# Patient Record
Sex: FEMALE | Race: WHITE | NOT HISPANIC OR LATINO | URBAN - METROPOLITAN AREA
[De-identification: names, ages, dates, MRNs, and addresses within clinical notes are randomized per-mention and may not be internally consistent; named-entity substitution may affect disease eponyms.]

---

## 2024-04-29 ENCOUNTER — LAB (OUTPATIENT)
Dept: LAB | Facility: LAB | Age: 39
End: 2024-04-29
Payer: COMMERCIAL

## 2024-04-29 ENCOUNTER — OFFICE VISIT (OUTPATIENT)
Dept: OBSTETRICS AND GYNECOLOGY | Facility: HOSPITAL | Age: 39
End: 2024-04-29
Payer: COMMERCIAL

## 2024-04-29 VITALS
BODY MASS INDEX: 22.66 KG/M2 | HEIGHT: 66 IN | SYSTOLIC BLOOD PRESSURE: 180 MMHG | DIASTOLIC BLOOD PRESSURE: 113 MMHG | WEIGHT: 141 LBS

## 2024-04-29 DIAGNOSIS — Z11.3 SCREEN FOR STD (SEXUALLY TRANSMITTED DISEASE): ICD-10-CM

## 2024-04-29 DIAGNOSIS — I10 HYPERTENSION, UNSPECIFIED TYPE: ICD-10-CM

## 2024-04-29 DIAGNOSIS — Z12.4 PAP SMEAR FOR CERVICAL CANCER SCREENING: ICD-10-CM

## 2024-04-29 DIAGNOSIS — N93.9 ABNORMAL UTERINE BLEEDING (AUB): Primary | ICD-10-CM

## 2024-04-29 DIAGNOSIS — N93.9 ABNORMAL UTERINE BLEEDING (AUB): ICD-10-CM

## 2024-04-29 LAB
ALBUMIN SERPL BCP-MCNC: 4.4 G/DL (ref 3.4–5)
ALP SERPL-CCNC: 78 U/L (ref 33–110)
ALT SERPL W P-5'-P-CCNC: 13 U/L (ref 7–45)
ANION GAP SERPL CALC-SCNC: 14 MMOL/L (ref 10–20)
AST SERPL W P-5'-P-CCNC: 21 U/L (ref 9–39)
BILIRUB SERPL-MCNC: 0.4 MG/DL (ref 0–1.2)
BUN SERPL-MCNC: 8 MG/DL (ref 6–23)
CALCIUM SERPL-MCNC: 10.1 MG/DL (ref 8.6–10.6)
CHLORIDE SERPL-SCNC: 104 MMOL/L (ref 98–107)
CO2 SERPL-SCNC: 26 MMOL/L (ref 21–32)
CREAT SERPL-MCNC: 0.88 MG/DL (ref 0.5–1.05)
EGFRCR SERPLBLD CKD-EPI 2021: 87 ML/MIN/1.73M*2
ERYTHROCYTE [DISTWIDTH] IN BLOOD BY AUTOMATED COUNT: 12.4 % (ref 11.5–14.5)
FSH SERPL-ACNC: 9 IU/L
GLUCOSE SERPL-MCNC: 78 MG/DL (ref 74–99)
HBV SURFACE AG SERPL QL IA: NONREACTIVE
HCT VFR BLD AUTO: 42.7 % (ref 36–46)
HCV AB SER QL: NONREACTIVE
HGB BLD-MCNC: 14 G/DL (ref 12–16)
HIV 1+2 AB+HIV1 P24 AG SERPL QL IA: NONREACTIVE
MCH RBC QN AUTO: 31.8 PG (ref 26–34)
MCHC RBC AUTO-ENTMCNC: 32.8 G/DL (ref 32–36)
MCV RBC AUTO: 97 FL (ref 80–100)
NRBC BLD-RTO: 0 /100 WBCS (ref 0–0)
PLATELET # BLD AUTO: 349 X10*3/UL (ref 150–450)
POTASSIUM SERPL-SCNC: 4.1 MMOL/L (ref 3.5–5.3)
PROT SERPL-MCNC: 7 G/DL (ref 6.4–8.2)
RBC # BLD AUTO: 4.4 X10*6/UL (ref 4–5.2)
SODIUM SERPL-SCNC: 140 MMOL/L (ref 136–145)
TREPONEMA PALLIDUM IGG+IGM AB [PRESENCE] IN SERUM OR PLASMA BY IMMUNOASSAY: NONREACTIVE
TSH SERPL-ACNC: 1.65 MIU/L (ref 0.44–3.98)
WBC # BLD AUTO: 10 X10*3/UL (ref 4.4–11.3)

## 2024-04-29 PROCEDURE — 99214 OFFICE O/P EST MOD 30 MIN: CPT | Performed by: OBSTETRICS & GYNECOLOGY

## 2024-04-29 PROCEDURE — 83001 ASSAY OF GONADOTROPIN (FSH): CPT

## 2024-04-29 PROCEDURE — 36415 COLL VENOUS BLD VENIPUNCTURE: CPT

## 2024-04-29 PROCEDURE — 86780 TREPONEMA PALLIDUM: CPT

## 2024-04-29 PROCEDURE — 99204 OFFICE O/P NEW MOD 45 MIN: CPT | Performed by: OBSTETRICS & GYNECOLOGY

## 2024-04-29 PROCEDURE — 1036F TOBACCO NON-USER: CPT | Performed by: OBSTETRICS & GYNECOLOGY

## 2024-04-29 PROCEDURE — 80053 COMPREHEN METABOLIC PANEL: CPT

## 2024-04-29 PROCEDURE — 87624 HPV HI-RISK TYP POOLED RSLT: CPT | Mod: 59 | Performed by: OBSTETRICS & GYNECOLOGY

## 2024-04-29 PROCEDURE — 87661 TRICHOMONAS VAGINALIS AMPLIF: CPT | Mod: 59 | Performed by: OBSTETRICS & GYNECOLOGY

## 2024-04-29 PROCEDURE — 87389 HIV-1 AG W/HIV-1&-2 AB AG IA: CPT

## 2024-04-29 PROCEDURE — 86803 HEPATITIS C AB TEST: CPT

## 2024-04-29 PROCEDURE — 3080F DIAST BP >= 90 MM HG: CPT | Performed by: OBSTETRICS & GYNECOLOGY

## 2024-04-29 PROCEDURE — 85027 COMPLETE CBC AUTOMATED: CPT

## 2024-04-29 PROCEDURE — 88175 CYTOPATH C/V AUTO FLUID REDO: CPT | Mod: TC,GCY | Performed by: OBSTETRICS & GYNECOLOGY

## 2024-04-29 PROCEDURE — 3077F SYST BP >= 140 MM HG: CPT | Performed by: OBSTETRICS & GYNECOLOGY

## 2024-04-29 PROCEDURE — 87340 HEPATITIS B SURFACE AG IA: CPT

## 2024-04-29 PROCEDURE — 87800 DETECT AGNT MULT DNA DIREC: CPT | Performed by: OBSTETRICS & GYNECOLOGY

## 2024-04-29 PROCEDURE — 84443 ASSAY THYROID STIM HORMONE: CPT

## 2024-04-29 RX ORDER — GABAPENTIN 300 MG/1
CAPSULE ORAL
COMMUNITY
Start: 2024-04-09

## 2024-04-29 RX ORDER — BUPROPION HYDROCHLORIDE 150 MG/1
150 TABLET ORAL
COMMUNITY

## 2024-04-29 RX ORDER — CITALOPRAM 10 MG/1
10 TABLET ORAL
COMMUNITY
Start: 2024-04-09

## 2024-04-29 RX ORDER — BUSPIRONE HYDROCHLORIDE 15 MG/1
15 TABLET ORAL 3 TIMES DAILY
COMMUNITY
Start: 2024-04-09

## 2024-04-29 RX ORDER — METHYLPHENIDATE HYDROCHLORIDE 18 MG/1
1 TABLET, EXTENDED RELEASE ORAL DAILY
COMMUNITY
Start: 2024-04-20

## 2024-04-29 NOTE — PROGRESS NOTES
Subjective   Patient ID: Jessica Franco is a 38 y.o. female who presents for Menstrual Problem (Patient states that she had a menstrual on 04/02/2024 lasted 5 days/Patient states that she got another menstrual 04/14/2024 lasted 3 days/Both menstruals were heavy, clotting, painful which is different then her normal menstrual cramping that she experiences).  38 year old patient, presents with complaint of heavy menses, hx ovarian cysts. Last seen by gyn 10 years ago.   Fam Hx Cervical cancer.     Placed on birth control for bleeding issues at age 19.  Menses was early in January (normally regular). Normal menses in February. In April menses a day late. Heavier bleeding than normal in April, passed small clots (pea-sized). Started 4/2, on 4/5 had a gush of blood. Bleeding lasted until 6th. Started spotting about a week later, started having reddish brown bleeding on 14th, on 18th had another normal cycle.   Took UPT x2, both negative. Bleeding stopped on the 20th, but has had ongoing cramping, which stopped by next day.     Health has otherwise been good.   +fatigue and some light-headedness.     Sexually active, one partner.    Review of Systems   All other systems reviewed and are negative.    Objective   Physical Exam  Vitals reviewed.   Constitutional:       Appearance: Normal appearance.   HENT:      Head: Normocephalic and atraumatic.      Right Ear: External ear normal.      Left Ear: External ear normal.      Nose: Nose normal.      Mouth/Throat:      Mouth: Mucous membranes are moist.   Eyes:      Extraocular Movements: Extraocular movements intact.   Neck:      Thyroid: No thyroid mass or thyromegaly.   Abdominal:      General: Abdomen is flat. Bowel sounds are normal.      Palpations: Abdomen is soft.      Tenderness: There is no abdominal tenderness. There is no right CVA tenderness or left CVA tenderness.   Genitourinary:     General: Normal vulva.      Exam position: Lithotomy position.      Labia:          Right: No lesion.         Left: No lesion.       Urethra: No prolapse, urethral swelling or urethral lesion.      Vagina: Normal.      Cervix: Normal.      Uterus: Normal.       Adnexa: Right adnexa normal and left adnexa normal.   Musculoskeletal:         General: Normal range of motion.      Right shoulder: Normal.      Left shoulder: Normal.      Cervical back: Normal, normal range of motion and neck supple.      Thoracic back: Normal.      Lumbar back: Normal.      Right hip: Normal.      Left hip: Normal.      Right upper leg: Normal.      Left upper leg: Normal.      Right knee: Normal.      Left knee: Normal.      Right lower leg: Normal.      Left lower leg: Normal.   Lymphadenopathy:      Lower Body: No right inguinal adenopathy. No left inguinal adenopathy.   Skin:     General: Skin is warm and dry.   Neurological:      General: No focal deficit present.      Mental Status: She is alert and oriented to person, place, and time.      Cranial Nerves: Cranial nerves 2-12 are intact.      Motor: Motor function is intact.   Psychiatric:         Attention and Perception: Attention and perception normal.         Mood and Affect: Mood and affect normal.         Behavior: Behavior normal.         Cognition and Memory: Cognition normal.         Judgment: Judgment normal.     Assessment/Plan   Problem List Items Addressed This Visit    None  Visit Diagnoses         Codes    Abnormal uterine bleeding (AUB)    -  Primary N93.9    Relevant Orders    CBC    US PELVIS TRANSABDOMINAL WITH TRANSVAGINAL    Follicle Stimulating Hormone    TSH with reflex to Free T4 if abnormal    Hypertension, unspecified type     I10    Relevant Orders    Referral to Primary Care    Comprehensive Metabolic Panel    Pap smear for cervical cancer screening     Z12.4    Relevant Orders    THINPREP PAP    Screen for STD (sexually transmitted disease)     Z11.3    Relevant Orders    Hepatitis B surface Ag    Hepatitis C Antibody    Syphilis Screen  with Reflex    HIV 1/2 Antigen/Antibody Screen with Reflex to Confirmation        Kim Navas MD 04/29/24 10:46 AM

## 2024-04-30 LAB
C TRACH RRNA SPEC QL NAA+PROBE: NEGATIVE
N GONORRHOEA DNA SPEC QL PROBE+SIG AMP: NEGATIVE
T VAGINALIS RRNA SPEC QL NAA+PROBE: NEGATIVE

## 2024-05-10 ENCOUNTER — TELEPHONE (OUTPATIENT)
Dept: OBSTETRICS AND GYNECOLOGY | Facility: HOSPITAL | Age: 39
End: 2024-05-10
Payer: COMMERCIAL

## 2024-05-10 LAB
CYTOLOGY CMNT CVX/VAG CYTO-IMP: NORMAL
HPV HR 12 DNA GENITAL QL NAA+PROBE: POSITIVE
HPV HR GENOTYPES PNL CVX NAA+PROBE: POSITIVE
HPV16 DNA SPEC QL NAA+PROBE: NEGATIVE
HPV18 DNA SPEC QL NAA+PROBE: NEGATIVE
LAB AP HPV GENOTYPE QUESTION: YES
LAB AP HPV HR: NORMAL
LAB AP PAP ADDITIONAL TESTS: NORMAL
LABORATORY COMMENT REPORT: NORMAL
LMP START DATE: NORMAL
MENSTRUAL HX REPORTED: NORMAL
PATH REPORT.TOTAL CANCER: NORMAL

## 2024-05-13 NOTE — TELEPHONE ENCOUNTER
----- Message from Kim Navas MD sent at 5/10/2024 11:15 AM EDT -----  Recommend colposcopy    RN placed call to patient to discuss pap results.   Patient identified by name and .  Patient informed her pap results came back with abnormal cells referred to as ASCUS  Patient notified that pap also came back with +HPV  Patient educated that HPV is a virus that is transmitted sexually and can cause cancer and/or genital warts over time.  Patient educated that she will need a colposcopy  Explained to patient that a colposcopy is an in office procedure that starts out like a normal pap but the provider will use a colposcope to get a better view of the abnormal cells on the cervix. The provider will then take a small biopsy to send to the lab to make sure nothing comes back precancerous  Patient encouraged to take tylenol or ibuprofen about one hour prior to appointment to help with any cramping  Explained to patient that she may experience some spotting for a few days  Patient scheduled for colpo on  at 9am with Dr. Navas  Encouraged patient to call office with any questions or concerns   Reyna Garza RN

## 2024-05-15 ENCOUNTER — APPOINTMENT (OUTPATIENT)
Dept: RADIOLOGY | Facility: HOSPITAL | Age: 39
End: 2024-05-15
Payer: COMMERCIAL

## 2024-05-21 ENCOUNTER — HOSPITAL ENCOUNTER (OUTPATIENT)
Dept: RADIOLOGY | Facility: HOSPITAL | Age: 39
Discharge: HOME | End: 2024-05-21
Payer: COMMERCIAL

## 2024-05-21 DIAGNOSIS — N93.9 ABNORMAL UTERINE BLEEDING (AUB): ICD-10-CM

## 2024-05-21 PROCEDURE — 76856 US EXAM PELVIC COMPLETE: CPT | Performed by: STUDENT IN AN ORGANIZED HEALTH CARE EDUCATION/TRAINING PROGRAM

## 2024-05-21 PROCEDURE — 76830 TRANSVAGINAL US NON-OB: CPT | Performed by: STUDENT IN AN ORGANIZED HEALTH CARE EDUCATION/TRAINING PROGRAM

## 2024-05-21 PROCEDURE — 76856 US EXAM PELVIC COMPLETE: CPT

## 2024-05-22 ENCOUNTER — TELEPHONE (OUTPATIENT)
Dept: OBSTETRICS AND GYNECOLOGY | Facility: HOSPITAL | Age: 39
End: 2024-05-22
Payer: COMMERCIAL

## 2024-05-22 NOTE — TELEPHONE ENCOUNTER
----- Message from Kim Navas MD sent at 5/22/2024  8:16 AM EDT -----  Labs, Ultrasound look normal. No clear reason for the abnormal bleeding at this point. Not concerned about the cervix. Can we find out what her menses is doing?

## 2024-05-23 NOTE — TELEPHONE ENCOUNTER
Result Communication    Resulted Orders   US PELVIS TRANSABDOMINAL WITH TRANSVAGINAL    Narrative    Interpreted By:  Froylan Mcnamara,  and Juana Duarte   STUDY:  US PELVIS TRANSABDOMINAL WITH TRANSVAGINAL;  5/21/2024 1:27 pm      INDICATION:  Signs/Symptoms:Abnormal bleeding, hx ovarian cysts.      COMPARISON:  None.      ACCESSION NUMBER(S):  [id number]      ORDERING CLINICIAN:  SRAVAN SHEARER      TECHNIQUE:  Multiple multiplanar static gray scale, color and spectral waveform  sonographic images of the pelvis were obtained. Transabdominal and  endovaginal ultrasound was performed.      FINDINGS:  UTERUS:  The uterus measures  4.35 cm x 3.57 cm x 7.35cm. The uterine  myometrium appears normal. There is a heterogeneous appearance of the  cervix without sonographic evidence of focal lesions.      ENDOMETRIUM:  The endometrium measures a thickness of 0.36 cm, which is normal.      RIGHT ADNEXA:  The right ovary measures 1.60 cm x 1.69 cm x 2.83 cm and demonstrates  normal flow. No gross right adnexal masses are seen, no hydrosalpinx.  There are cysts in the right ovary measuring 1.3 x 1.3 x 1.2 cm and 1  x 0.7 x 0.8 cm.      LEFT ADNEXA:  The left ovary measures 2.46 cm x 2.48 cm x 3.70 cm and demonstrates  normal flow. No gross left adnexal masses are seen, no hydrosalpinx.  There is a cyst in the left ovary measuring 1 x 1.2 x 1.1 cm.      CUL DE SAC:  No gross free fluid is seen in the pelvic cul-de-sac.        Impression    1. Indeterminate heterogeneous appearance of the endocervix without  sonographic evidence of focal lesions. Hysteroscopy can be obtained  for further assessment as per clinical discretion.  2. Otherwise, unremarkable pelvic ultrasound.      I personally reviewed the images/study and I agree with the findings  as stated by resident physician Dr. Gerald Hsu . This study  was interpreted at [facility],  [place], [place].      MACRO:  None       Signed by: Froylan Mcnamara 2024 9:12 PM  Dictation workstation:   [id number]       Verified patient by name and .   Called patient to let her know hat results came back normal, no clear reason for abnormal bleeding.   Patient states the abnormal bleeding has subsided and she had a normal period last week.   Discussed with patient that sometimes women can have abnormal bleeding without any cause and go back to having normal periods.   Also reminded patient about upcoming colpo appointment   Results were successfully communicated with the patient and they acknowledged their understanding.

## 2024-06-10 ENCOUNTER — TELEPHONE (OUTPATIENT)
Dept: OBSTETRICS AND GYNECOLOGY | Facility: HOSPITAL | Age: 39
End: 2024-06-10
Payer: COMMERCIAL

## 2024-06-10 NOTE — TELEPHONE ENCOUNTER
Verified patient by name and .   Patient calling in with concerns regarding period and colposcopy tomorrow.   Patient reports her period started early and has been light to moderate in flow.   Let patient know she is still able to have colposcopy done as long as her flow is not heavy.   Patient verbalized understanding and all questions and concerns addressed.

## 2024-06-11 ENCOUNTER — PROCEDURE VISIT (OUTPATIENT)
Dept: OBSTETRICS AND GYNECOLOGY | Facility: HOSPITAL | Age: 39
End: 2024-06-11
Payer: COMMERCIAL

## 2024-06-11 VITALS — SYSTOLIC BLOOD PRESSURE: 158 MMHG | DIASTOLIC BLOOD PRESSURE: 127 MMHG | BODY MASS INDEX: 23.08 KG/M2 | WEIGHT: 143 LBS

## 2024-06-11 DIAGNOSIS — R87.810 ASCUS WITH POSITIVE HIGH RISK HPV CERVICAL: Primary | ICD-10-CM

## 2024-06-11 DIAGNOSIS — R87.610 ASCUS WITH POSITIVE HIGH RISK HPV CERVICAL: Primary | ICD-10-CM

## 2024-06-11 PROCEDURE — 57455 BIOPSY OF CERVIX W/SCOPE: CPT | Performed by: OBSTETRICS & GYNECOLOGY

## 2024-06-11 ASSESSMENT — PAIN SCALES - GENERAL: PAINLEVEL: 0-NO PAIN

## 2024-06-11 NOTE — PROGRESS NOTES
Patient ID: Jessica Franco is a 38 y.o. female who presents for colposcopy.  Pap 4/29/24 ASCUS +HR HPV other    Colposcopy    Date/Time: 6/11/2024 9:38 AM    Performed by: Kim Navas MD  Authorized by: Kim Navas MD    Procedure location: cervix    Consent:     Patient questions answered: yes      Risks and benefits of the procedure and its alternatives discussed: yes      Procedural risks discussed:  Bleeding and infection    Consent obtained:  Written    Consent given by:  Patient  Universal Protocol:     A time out verifies correct patient, procedure, equipment, support staff, and site/side marked as required: yes      Patient states understanding of procedure being performed: yes      Test results available and properly labeled: yes    Indication:     Cervical indication(s): high-risk HPV positive and ASCUS    Pre-procedure:     Speculum was placed in the vagina: yes      Prep solution(s): acetic acid    Procedure:     Colposcopy with: colposcopy only and cervical biopsy      Biopsy taken: yes      # of biopsies:  1    Biopsy location(s): 12    Cervix visibility: fully visualized      SCJ visibility: fully visualized      Lesion visualized: fully visualized      Acetowhite lesion(s): cervix      Acetowhite lesion(s) description:  Thin area surrounding T-zone    Cervical impression: low grade    Post-procedure:     Patient tolerance of procedure:  Patient tolerated the procedure well with no immediate complications    Instructions and paperwork completed: yes        AW changes with mosaic pattern from 11-2   A: Mild dysplasia/RAD 1  Biopsy sent. Will call with results.

## 2024-06-18 LAB
LABORATORY COMMENT REPORT: NORMAL
PATH REPORT.FINAL DX SPEC: NORMAL
PATH REPORT.GROSS SPEC: NORMAL
PATH REPORT.RELEVANT HX SPEC: NORMAL
PATH REPORT.TOTAL CANCER: NORMAL

## 2024-06-19 ENCOUNTER — OFFICE VISIT (OUTPATIENT)
Dept: PRIMARY CARE | Facility: CLINIC | Age: 39
End: 2024-06-19
Payer: COMMERCIAL

## 2024-06-19 ENCOUNTER — TELEPHONE (OUTPATIENT)
Dept: OBSTETRICS AND GYNECOLOGY | Facility: HOSPITAL | Age: 39
End: 2024-06-19

## 2024-06-19 VITALS
WEIGHT: 141 LBS | DIASTOLIC BLOOD PRESSURE: 107 MMHG | SYSTOLIC BLOOD PRESSURE: 149 MMHG | OXYGEN SATURATION: 96 % | HEIGHT: 66 IN | BODY MASS INDEX: 22.66 KG/M2 | HEART RATE: 91 BPM

## 2024-06-19 DIAGNOSIS — F98.8 ATTENTION DEFICIT DISORDER, UNSPECIFIED HYPERACTIVITY PRESENCE: ICD-10-CM

## 2024-06-19 DIAGNOSIS — K27.9 PEPTIC ULCER DISEASE: ICD-10-CM

## 2024-06-19 DIAGNOSIS — Z00.00 HEALTH CARE MAINTENANCE: ICD-10-CM

## 2024-06-19 DIAGNOSIS — K29.70 GASTRITIS, PRESENCE OF BLEEDING UNSPECIFIED, UNSPECIFIED CHRONICITY, UNSPECIFIED GASTRITIS TYPE: ICD-10-CM

## 2024-06-19 DIAGNOSIS — I10 HYPERTENSION, UNSPECIFIED TYPE: Primary | ICD-10-CM

## 2024-06-19 DIAGNOSIS — R19.7 DIARRHEA, UNSPECIFIED TYPE: ICD-10-CM

## 2024-06-19 PROBLEM — F41.9 ANXIETY: Status: ACTIVE | Noted: 2020-12-28

## 2024-06-19 PROBLEM — G93.32 MYALGIC ENCEPHALOMYELITIS: Status: ACTIVE | Noted: 2020-11-04

## 2024-06-19 PROBLEM — F43.20 GRIEF REACTION: Status: ACTIVE | Noted: 2020-12-28

## 2024-06-19 PROBLEM — R10.13 EPIGASTRIC PAIN: Status: ACTIVE | Noted: 2018-11-07

## 2024-06-19 PROBLEM — F41.1 GENERALIZED ANXIETY DISORDER: Status: ACTIVE | Noted: 2021-07-12

## 2024-06-19 PROBLEM — R10.30 LOWER ABDOMINAL PAIN: Status: ACTIVE | Noted: 2019-07-24

## 2024-06-19 PROBLEM — K29.90 GASTRODUODENITIS: Status: ACTIVE | Noted: 2018-11-07

## 2024-06-19 PROBLEM — F43.21 GRIEF REACTION: Status: ACTIVE | Noted: 2020-12-28

## 2024-06-19 PROCEDURE — 3080F DIAST BP >= 90 MM HG: CPT | Performed by: INTERNAL MEDICINE

## 2024-06-19 PROCEDURE — 1036F TOBACCO NON-USER: CPT | Performed by: INTERNAL MEDICINE

## 2024-06-19 PROCEDURE — 3077F SYST BP >= 140 MM HG: CPT | Performed by: INTERNAL MEDICINE

## 2024-06-19 PROCEDURE — 99204 OFFICE O/P NEW MOD 45 MIN: CPT | Performed by: INTERNAL MEDICINE

## 2024-06-19 RX ORDER — HYDROCHLOROTHIAZIDE 25 MG/1
25 TABLET ORAL DAILY
Qty: 30 TABLET | Refills: 1 | Status: SHIPPED | OUTPATIENT
Start: 2024-06-19 | End: 2024-08-18

## 2024-06-19 RX ORDER — CHOLESTYRAMINE 4 G/5.5G
4 POWDER, FOR SUSPENSION ORAL 3 TIMES DAILY
Qty: 90 PACKET | Refills: 1 | Status: SHIPPED | OUTPATIENT
Start: 2024-06-19

## 2024-06-19 NOTE — LETTER
Dear Jessica,       We have been unable to reach you by phone and would like to discuss results.     Please call [phone] to discuss at your earliest convenience.

## 2024-06-19 NOTE — ASSESSMENT & PLAN NOTE
Hypertension new diagnosis.  Start hydrochlorothiazide.  Question as to whether or not it might be a side effect of the Ritalin or the Wellbutrin.  Please discuss with your psychiatrist.  In the meantime start the hydrochlorothiazide.  Advised low-salt diet.  Follow-up 1 month for blood pressure recheck.

## 2024-06-19 NOTE — ASSESSMENT & PLAN NOTE
Misc Possibly related to cholecystectomy.  Start cholestyramine.  Follow-up 30 days for reevaluation

## 2024-06-19 NOTE — PROGRESS NOTES
"Subjective   Patient ID: Jessica Franco is a 38 y.o. female who presents for Establish Care.    HPI     Patient is a 38-year-old female with past medical history of anxiety depression ADD who presents at the request of her gynecologist due to significantly elevated blood pressure readings.  She was in the emergency room in April and noted to have significantly elevated blood pressure and subsequently triggered during a gynecology office visit her blood pressure was also substantially elevated.  She has no headache changes in vision orthopnea.  She has been on Wellbutrin since 2019.  She has been on the Ritalin for a number of years as well.  No recent changes in dietary habits.  No recent triggers or stressors.    She works as a nurse.  Denies illicit substances or smoking.  Drinks alcohol occasionally.    She does have episodes of diarrhea which has been thought to be due to be post cholecystectomy diarrhea.  She has been on cholestyramine in the past but currently not on the medication.  She is up-to-date on her screening labs other than her lipid panel and she is up to date on her Pap smear        Review of Systems  Constitutional: No fever or chills  Cardiovascular: no chest pain, no palpitations and no syncope.   Respiratory: no cough, no shortness of breath during exertion and no shortness of breath at rest.   Gastrointestinal: no abdominal pain, no nausea and no vomiting.  Neuro: No Headache, no dizziness    Objective   BP (!) 149/107   Pulse 91   Ht 1.676 m (5' 6\")   Wt 64 kg (141 lb)   LMP 06/09/2024 (Exact Date)   SpO2 96%   BMI 22.76 kg/m²     Physical Exam  Constitutional: Alert and in no acute distress. Well developed, well nourished  Head and Face: Head and face: Normal.    Cardiovascular: Heart rate and rhythm were normal, normal S1 and S2. No peripheral edema.   Pulmonary: No respiratory distress. Clear bilateral breath sounds.  Musculoskeletal: Gait and station: Normal. Muscle strength/tone: " Normal.   Skin: Normal skin color and pigmentation, normal skin turgor, and no rash.    Psychiatric: Judgment and insight: Intact. Mood and affect: Normal.  Abdomen soft right upper quadrant tenderness to palpation but no guarding.     Procedures    Lab Results   Component Value Date    WBC 10.0 04/29/2024    HGB 14.0 04/29/2024    HCT 42.7 04/29/2024     04/29/2024    ALT 13 04/29/2024    AST 21 04/29/2024     04/29/2024    K 4.1 04/29/2024     04/29/2024    CREATININE 0.88 04/29/2024    BUN 8 04/29/2024    CO2 26 04/29/2024    TSH 1.65 04/29/2024       US PELVIS TRANSABDOMINAL WITH TRANSVAGINAL  Narrative: Interpreted By:  Froylan Mcnamara,  and Juana Duarte   STUDY:  US PELVIS TRANSABDOMINAL WITH TRANSVAGINAL;  5/21/2024 1:27 pm      INDICATION:  Signs/Symptoms:Abnormal bleeding, hx ovarian cysts.      COMPARISON:  None.      ACCESSION NUMBER(S):  [id number]      ORDERING CLINICIAN:  SRAVAN SHEARER      TECHNIQUE:  Multiple multiplanar static gray scale, color and spectral waveform  sonographic images of the pelvis were obtained. Transabdominal and  endovaginal ultrasound was performed.      FINDINGS:  UTERUS:  The uterus measures  4.35 cm x 3.57 cm x 7.35cm. The uterine  myometrium appears normal. There is a heterogeneous appearance of the  cervix without sonographic evidence of focal lesions.      ENDOMETRIUM:  The endometrium measures a thickness of 0.36 cm, which is normal.      RIGHT ADNEXA:  The right ovary measures 1.60 cm x 1.69 cm x 2.83 cm and demonstrates  normal flow. No gross right adnexal masses are seen, no hydrosalpinx.  There are cysts in the right ovary measuring 1.3 x 1.3 x 1.2 cm and 1  x 0.7 x 0.8 cm.      LEFT ADNEXA:  The left ovary measures 2.46 cm x 2.48 cm x 3.70 cm and demonstrates  normal flow. No gross left adnexal masses are seen, no hydrosalpinx.  There is a cyst in the left ovary measuring 1 x 1.2 x 1.1 cm.      CUL DE SAC:  No gross free fluid is seen  in the pelvic cul-de-sac.      Impression: 1. Indeterminate heterogeneous appearance of the endocervix without  sonographic evidence of focal lesions. Hysteroscopy can be obtained  for further assessment as per clinical discretion.  2. Otherwise, unremarkable pelvic ultrasound.      I personally reviewed the images/study and I agree with the findings  as stated by resident physician Dr. Gerald Hsu . This study  was interpreted at [facility],  [place], [place].      MACRO:  None      Signed by: Froylan Mcnamara 5/21/2024 9:12 PM  Dictation workstation:   [id number]            Assessment/Plan   Problem List Items Addressed This Visit             ICD-10-CM    Attention deficit disorder F98.8     Continue Ritalin and Wellbutrin per psychiatry         Diarrhea R19.7     Possibly related to cholecystectomy.  Start cholestyramine.  Follow-up 30 days for reevaluation         Relevant Medications    cholestyramine-aspartame (Prevalite) 4 gram packet    Hypertension - Primary I10     Hypertension new diagnosis.  Start hydrochlorothiazide.  Question as to whether or not it might be a side effect of the Ritalin or the Wellbutrin.  Please discuss with your psychiatrist.  In the meantime start the hydrochlorothiazide.  Advised low-salt diet.  Follow-up 1 month for blood pressure recheck.         Relevant Medications    hydroCHLOROthiazide (HYDRODiuril) 25 mg tablet    Gastritis K29.70    Peptic ulcer disease K27.9     Asymptomatic.  Will continue to monitor at this time.          Other Visit Diagnoses         Codes    Health care maintenance     Z00.00    Relevant Orders    Lipid Panel

## 2024-06-19 NOTE — TELEPHONE ENCOUNTER
----- Message from Kim Navas MD sent at 6/19/2024 11:53 AM EDT -----  Biopsy negative. Follow-up Pap with HPV in 1 year.

## 2024-06-28 NOTE — TELEPHONE ENCOUNTER
Several attempts have been made to contact the patient   RN called patient at the number listed in her chart on 6/19,6/21 and 6/28.  A [employer] message was also sent on 6/25 and has not alyce read.   A certified letter is being sent to the address on file encouraging patient to call the office, provider notified.  Tracking number: [id number].

## 2024-07-08 NOTE — TELEPHONE ENCOUNTER
Result Communication    Resulted Orders   Surgical Pathology Exam   Result Value Ref Range    Case Report       Surgical Pathology                                Case: [id number]                                  Authorizing Provider:  Kim Navas MD   Collected:           06/11/2024 1022              Ordering Location:     [facility]       Received:            06/11/2024 [facility]                                     [facility]                                                                     Pathologist:           Oleg Snow MD                                                        Specimen:    CERVIX BIOPSY, 12                                                                          FINAL DIAGNOSIS       A. CERVIX BIOPSY:   -- TRANSFORMATION ZONE, NO PATHOLOGIC DIAGNOSIS              By the signature on this report, the individual or group listed as making the Final Interpretation/Diagnosis certifies that they have reviewed this case.       Clinical History       ASCUS +HR HPV      Gross Description       A: Received in formalin, labeled with the patient's name and hospital number, is a crescent shaped fragment of mucosa covered soft tissue admixed with mucoid material measuring 0.4 x 0.3 x 0.2 cm.  The deep surface is inked blue.  The specimen is submitted in toto in one cassette.    MRS           11:18 AM    Patient called in regarding certified letter.   Let patient know her biopsy was negative and Dr. Navas is recommending follow up with repeat pap in 1 year.   Reminder added to system.   Results were successfully communicated with the patient and they acknowledged their understanding.

## 2024-07-12 ENCOUNTER — APPOINTMENT (OUTPATIENT)
Dept: PRIMARY CARE | Facility: CLINIC | Age: 39
End: 2024-07-12
Payer: COMMERCIAL

## 2024-07-24 ENCOUNTER — APPOINTMENT (OUTPATIENT)
Dept: PRIMARY CARE | Facility: CLINIC | Age: 39
End: 2024-07-24
Payer: COMMERCIAL

## 2024-07-24 VITALS
DIASTOLIC BLOOD PRESSURE: 88 MMHG | OXYGEN SATURATION: 98 % | HEART RATE: 71 BPM | WEIGHT: 142 LBS | BODY MASS INDEX: 22.92 KG/M2 | SYSTOLIC BLOOD PRESSURE: 138 MMHG

## 2024-07-24 DIAGNOSIS — I10 HYPERTENSION, UNSPECIFIED TYPE: ICD-10-CM

## 2024-07-24 PROCEDURE — 3079F DIAST BP 80-89 MM HG: CPT | Performed by: INTERNAL MEDICINE

## 2024-07-24 PROCEDURE — 1036F TOBACCO NON-USER: CPT | Performed by: INTERNAL MEDICINE

## 2024-07-24 PROCEDURE — 3075F SYST BP GE 130 - 139MM HG: CPT | Performed by: INTERNAL MEDICINE

## 2024-07-24 PROCEDURE — 99213 OFFICE O/P EST LOW 20 MIN: CPT | Performed by: INTERNAL MEDICINE

## 2024-07-24 RX ORDER — HYDROCHLOROTHIAZIDE 50 MG/1
50 TABLET ORAL DAILY
Qty: 30 TABLET | Refills: 1 | Status: SHIPPED | OUTPATIENT
Start: 2024-07-24 | End: 2024-09-22

## 2024-07-24 RX ORDER — HYDROCHLOROTHIAZIDE 25 MG/1
50 TABLET ORAL DAILY
Qty: 30 TABLET | Refills: 1 | Status: SHIPPED | OUTPATIENT
Start: 2024-07-24 | End: 2024-07-24

## 2024-07-24 NOTE — PROGRESS NOTES
Subjective   Patient ID: Jessica Franco is a 38 y.o. female who presents for Follow-up.    HPI     Patient is a 38-year-old female with past medical history of anxiety depression ADD who presents for follow-up to hypertension.    She does have ongoing anxiety and depression as well as ADHD currently on Wellbutrin and Ritalin.  Last visit she was started on HCTZ.  Blood pressure today improved but not at goal.  She takes her blood pressure at home and its consistently in the low 140s systolic.  She takes her medication as prescribed.  Other than increased urination she is tolerating well.  No headaches changes in vision orthopnea.      She works as a nurse.  Denies illicit substances or smoking.  Drinks alcohol occasionally.    She does have episodes of diarrhea which has been thought to be due to be post cholecystectomy diarrhea.  She has been on cholestyramine in the past and recently restarted.  She states that her symptoms have improved.        Review of Systems  Constitutional: No fever or chills  Cardiovascular: no chest pain, no palpitations and no syncope.   Respiratory: no cough, no shortness of breath during exertion and no shortness of breath at rest.   Gastrointestinal: no abdominal pain, no nausea and no vomiting.  Neuro: No Headache, no dizziness    Objective   BP (!) 153/112   Pulse 71   Wt 64.4 kg (142 lb)   SpO2 98%   BMI 22.92 kg/m²     Physical Exam  Constitutional: Alert and in no acute distress. Well developed, well nourished  Head and Face: Head and face: Normal.    Cardiovascular: Heart rate and rhythm were normal, normal S1 and S2. No peripheral edema.   Pulmonary: No respiratory distress. Clear bilateral breath sounds.  Musculoskeletal: Gait and station: Normal. Muscle strength/tone: Normal.   Skin: Normal skin color and pigmentation, normal skin turgor, and no rash.    Psychiatric: Judgment and insight: Intact. Mood and affect: Normal.  Abdomen soft right upper quadrant tenderness to  palpation but no guarding.     Procedures    Lab Results   Component Value Date    WBC 10.0 04/29/2024    HGB 14.0 04/29/2024    HCT 42.7 04/29/2024     04/29/2024    ALT 13 04/29/2024    AST 21 04/29/2024     04/29/2024    K 4.1 04/29/2024     04/29/2024    CREATININE 0.88 04/29/2024    BUN 8 04/29/2024    CO2 26 04/29/2024    TSH 1.65 04/29/2024       US PELVIS TRANSABDOMINAL WITH TRANSVAGINAL  Narrative: Interpreted By:  Froylan Mcnamara,  and Juana Duarte   STUDY:  US PELVIS TRANSABDOMINAL WITH TRANSVAGINAL;  5/21/2024 1:27 pm      INDICATION:  Signs/Symptoms:Abnormal bleeding, hx ovarian cysts.      COMPARISON:  None.      ACCESSION NUMBER(S):  [id number]      ORDERING CLINICIAN:  SRAVAN SHEARER      TECHNIQUE:  Multiple multiplanar static gray scale, color and spectral waveform  sonographic images of the pelvis were obtained. Transabdominal and  endovaginal ultrasound was performed.      FINDINGS:  UTERUS:  The uterus measures  4.35 cm x 3.57 cm x 7.35cm. The uterine  myometrium appears normal. There is a heterogeneous appearance of the  cervix without sonographic evidence of focal lesions.      ENDOMETRIUM:  The endometrium measures a thickness of 0.36 cm, which is normal.      RIGHT ADNEXA:  The right ovary measures 1.60 cm x 1.69 cm x 2.83 cm and demonstrates  normal flow. No gross right adnexal masses are seen, no hydrosalpinx.  There are cysts in the right ovary measuring 1.3 x 1.3 x 1.2 cm and 1  x 0.7 x 0.8 cm.      LEFT ADNEXA:  The left ovary measures 2.46 cm x 2.48 cm x 3.70 cm and demonstrates  normal flow. No gross left adnexal masses are seen, no hydrosalpinx.  There is a cyst in the left ovary measuring 1 x 1.2 x 1.1 cm.      CUL DE SAC:  No gross free fluid is seen in the pelvic cul-de-sac.      Impression: 1. Indeterminate heterogeneous appearance of the endocervix without  sonographic evidence of focal lesions. Hysteroscopy can be obtained  for further assessment  as per clinical discretion.  2. Otherwise, unremarkable pelvic ultrasound.      I personally reviewed the images/study and I agree with the findings  as stated by resident physician Dr. Gerald Hsu . This study  was interpreted at [place], [place].      MACRO:  None      Signed by: Froylan Mcnamara 5/21/2024 9:12 PM  Dictation workstation:   [id number]            Assessment/Plan   Problem List Items Addressed This Visit             ICD-10-CM    Hypertension I10    Relevant Medications    hydroCHLOROthiazide (HYDRODiuril) 50 mg tablet     Not at goal.  Will increase the hydrochlorothiazide to 50 mg once daily.  Check renal function in 1 to 2 weeks.  Follow-up 1 to 2 months for blood pressure recheck.  Continue with ambulatory blood pressure monitoring.  Please follow-up with your psychiatrist to discuss whether or not the Wellbutrin or Concerta might be contributing to the elevated blood pressure readings as well.

## 2024-08-29 DIAGNOSIS — R19.7 DIARRHEA, UNSPECIFIED TYPE: ICD-10-CM

## 2024-08-30 RX ORDER — CHOLESTYRAMINE 4 G/5.5G
4 POWDER, FOR SUSPENSION ORAL 3 TIMES DAILY
Qty: 90 PACKET | Refills: 1 | Status: SHIPPED | OUTPATIENT
Start: 2024-08-30

## 2024-09-24 ENCOUNTER — APPOINTMENT (OUTPATIENT)
Dept: PRIMARY CARE | Facility: CLINIC | Age: 39
End: 2024-09-24
Payer: COMMERCIAL

## 2024-10-01 ASSESSMENT — ENCOUNTER SYMPTOMS
BLURRED VISION: 0
SHORTNESS OF BREATH: 0
HEADACHES: 0
ORTHOPNEA: 0
PND: 0
SWEATS: 0
NECK PAIN: 0
PALPITATIONS: 0
HYPERTENSION: 1

## 2024-10-31 ENCOUNTER — APPOINTMENT (OUTPATIENT)
Dept: PRIMARY CARE | Facility: CLINIC | Age: 39
End: 2024-10-31
Payer: COMMERCIAL

## 2024-10-31 VITALS
WEIGHT: 140 LBS | HEART RATE: 80 BPM | OXYGEN SATURATION: 98 % | DIASTOLIC BLOOD PRESSURE: 88 MMHG | BODY MASS INDEX: 22.6 KG/M2 | SYSTOLIC BLOOD PRESSURE: 130 MMHG

## 2024-10-31 DIAGNOSIS — I10 HYPERTENSION, UNSPECIFIED TYPE: ICD-10-CM

## 2024-10-31 DIAGNOSIS — R19.7 DIARRHEA, UNSPECIFIED TYPE: ICD-10-CM

## 2024-10-31 PROCEDURE — 1036F TOBACCO NON-USER: CPT | Performed by: INTERNAL MEDICINE

## 2024-10-31 PROCEDURE — 3075F SYST BP GE 130 - 139MM HG: CPT | Performed by: INTERNAL MEDICINE

## 2024-10-31 PROCEDURE — 99213 OFFICE O/P EST LOW 20 MIN: CPT | Performed by: INTERNAL MEDICINE

## 2024-10-31 PROCEDURE — 3079F DIAST BP 80-89 MM HG: CPT | Performed by: INTERNAL MEDICINE

## 2024-10-31 RX ORDER — CHOLESTYRAMINE 4 G/5.5G
4 POWDER, FOR SUSPENSION ORAL 3 TIMES DAILY
Qty: 90 PACKET | Refills: 1 | Status: SHIPPED | OUTPATIENT
Start: 2024-10-31

## 2024-10-31 RX ORDER — HYDROCHLOROTHIAZIDE 25 MG/1
25 TABLET ORAL DAILY
Qty: 30 TABLET | Refills: 5 | Status: SHIPPED | OUTPATIENT
Start: 2024-10-31 | End: 2025-04-29

## 2024-10-31 RX ORDER — HYDROCHLOROTHIAZIDE 25 MG/1
25 TABLET ORAL DAILY
Qty: 30 TABLET | Refills: 5 | Status: SHIPPED | OUTPATIENT
Start: 2024-10-31 | End: 2024-10-31

## 2024-10-31 RX ORDER — AMLODIPINE BESYLATE 5 MG/1
5 TABLET ORAL DAILY
Qty: 30 TABLET | Refills: 5 | Status: SHIPPED | OUTPATIENT
Start: 2024-10-31 | End: 2024-10-31

## 2024-10-31 RX ORDER — AMLODIPINE BESYLATE 5 MG/1
5 TABLET ORAL DAILY
Qty: 30 TABLET | Refills: 5 | Status: SHIPPED | OUTPATIENT
Start: 2024-10-31 | End: 2025-04-29

## 2024-11-08 DIAGNOSIS — R19.7 DIARRHEA, UNSPECIFIED TYPE: ICD-10-CM

## 2024-11-11 RX ORDER — CHOLESTYRAMINE 4 G/4.8G
4 POWDER, FOR SUSPENSION ORAL 3 TIMES DAILY
Qty: 270 PACKET | Refills: 1 | Status: SHIPPED | OUTPATIENT
Start: 2024-11-11

## 2024-12-06 ENCOUNTER — TELEPHONE (OUTPATIENT)
Dept: OBSTETRICS AND GYNECOLOGY | Facility: CLINIC | Age: 39
End: 2024-12-06
Payer: COMMERCIAL

## 2024-12-06 NOTE — TELEPHONE ENCOUNTER
Left message for pt to return call.  Pt has nurse visit on 12/9/2024 for possible pregnancy, birth control and hormones.  Pt needs a provider appt.

## 2024-12-09 ENCOUNTER — TELEPHONE (OUTPATIENT)
Dept: OBSTETRICS AND GYNECOLOGY | Facility: CLINIC | Age: 39
End: 2024-12-09

## 2024-12-09 ENCOUNTER — APPOINTMENT (OUTPATIENT)
Dept: OBSTETRICS AND GYNECOLOGY | Facility: CLINIC | Age: 39
End: 2024-12-09
Payer: COMMERCIAL

## 2024-12-09 ENCOUNTER — OFFICE VISIT (OUTPATIENT)
Dept: OBSTETRICS AND GYNECOLOGY | Facility: CLINIC | Age: 39
End: 2024-12-09
Payer: COMMERCIAL

## 2024-12-09 VITALS
WEIGHT: 146 LBS | SYSTOLIC BLOOD PRESSURE: 134 MMHG | BODY MASS INDEX: 23.46 KG/M2 | DIASTOLIC BLOOD PRESSURE: 96 MMHG | HEIGHT: 66 IN

## 2024-12-09 DIAGNOSIS — Z32.01 PREGNANCY TEST POSITIVE (HHS-HCC): Primary | ICD-10-CM

## 2024-12-09 PROCEDURE — 99212 OFFICE O/P EST SF 10 MIN: CPT

## 2024-12-09 PROCEDURE — 3075F SYST BP GE 130 - 139MM HG: CPT

## 2024-12-09 PROCEDURE — 3008F BODY MASS INDEX DOCD: CPT

## 2024-12-09 PROCEDURE — 3080F DIAST BP >= 90 MM HG: CPT

## 2024-12-09 ASSESSMENT — ENCOUNTER SYMPTOMS
CARDIOVASCULAR NEGATIVE: 0
HEMATOLOGIC/LYMPHATIC NEGATIVE: 0
PSYCHIATRIC NEGATIVE: 0
ENDOCRINE NEGATIVE: 0
MUSCULOSKELETAL NEGATIVE: 0
EYES NEGATIVE: 0
ALLERGIC/IMMUNOLOGIC NEGATIVE: 0
CONSTITUTIONAL NEGATIVE: 0
RESPIRATORY NEGATIVE: 0
GASTROINTESTINAL NEGATIVE: 0
NEUROLOGICAL NEGATIVE: 0

## 2024-12-09 NOTE — TELEPHONE ENCOUNTER
Left message for pt to return call.  Pt has nurse visit on 12/9/2024 for possible pregnancy, birth control and hormones.  Pt needs a provider appt.  Sent pt a my chart message also.

## 2024-12-09 NOTE — PROGRESS NOTES
"Subjective   Jessica is a 39 y.o. female who presents for contraception counseling and concerns for a equivocal positive pregnancy tests.   Patient has had a positive pregnancy test on the  followed by some negative tests. Patient reports LMP of 20 days ago. She reports that she is not interested in having children and would like birth control.      Sexual Activity: sexually active, male partners; Patient reports 1 partners in the last 12 months.  Pertinent past medical history: hypertension.    OB History          0    Para   0    Term   0       0    AB   0    Living   0         SAB   0    IAB   0    Ectopic   0    Multiple   0    Live Births   0                  Objective   BP (!) 134/96   Ht 1.676 m (5' 6\")   Wt 66.2 kg (146 lb)   BMI 23.57 kg/m²     General: NAD, mood appropriate  Cardiopulmonary: warm and well perfused, breathing comfortably on room air  Abdomen:  non-tender  Extremities: Symmetric   Pelvic Exam deferred     Lab Review  /96 today.     Assessment/Plan   Risks, benefits, and expected side effects of available hormonal contraception methods were discussed, including IUDs, Nexplanon, Depo-Provera, vaginal ring, contraception patch, COCs, and POPs. Non-hormonal contraception methods including copper IUD, Phexxi, barrier methods, and fertility awareness were also discussed.     Jessica decided on IUD, plan to schedule placement after today's visit.   We discussed that it might be too early to know if she is pregnant or not. We discussed scheduling IUD placement in 2 weeks, after period is due. If no menses and positive pregnancy test patient intends to cancel at that time.       Jessica was seen today for contraception.  Diagnoses and all orders for this visit:  Pregnancy test positive ([facility]) (Primary)  -     Human Chorionic Gonadotropin, Serum Quantitative; Future       BLAYNE Cota          "

## 2024-12-20 ENCOUNTER — PROCEDURE VISIT (OUTPATIENT)
Dept: OBSTETRICS AND GYNECOLOGY | Facility: CLINIC | Age: 39
End: 2024-12-20
Payer: COMMERCIAL

## 2024-12-20 VITALS
WEIGHT: 144.5 LBS | BODY MASS INDEX: 23.32 KG/M2 | DIASTOLIC BLOOD PRESSURE: 104 MMHG | HEART RATE: 68 BPM | SYSTOLIC BLOOD PRESSURE: 146 MMHG

## 2024-12-20 DIAGNOSIS — Z30.430 ENCOUNTER FOR IUD INSERTION: Primary | ICD-10-CM

## 2024-12-20 PROCEDURE — 58300 INSERT INTRAUTERINE DEVICE: CPT

## 2024-12-20 PROCEDURE — 2500000004 HC RX 250 GENERAL PHARMACY W/ HCPCS (ALT 636 FOR OP/ED)

## 2024-12-20 NOTE — PROGRESS NOTES
IUD Insertion Procedure Note    Indications: contraception    Procedure Details   Urine pregnancy test was done today and result was negative .  The risks (including infection, bleeding, pain, and uterine perforation) and benefits of the procedure were explained to the patient and  both written and verbal   informed consent was obtained.      Procedure: Liletta (IUD) placement  Cervix cleansed with Betadine. Uterus sounded to 7 cm.   Local anesthesia:  1% lidocaine  Tenaculum used:  Yes, single tooth, posterior  IUD inserted without difficulty.   String visible and trimmed to 6cm.  Patient tolerated procedure well.    Condition:  Stable    Complications:  None    Plan:  The patient was advised to call for any fever or for prolonged or severe pain or bleeding. She was advised to use OTC analgesics as needed for mild to moderate pain.   Plan to return in 2 weeks for IUD check.     BLAYNE Cota       IUD Insertion    Performed by: BLAYNE Cota  Authorized by: BLAYNE Cota    Procedure: IUD insertion    Consent obtained by patient, parent, or legal power of  - including discussion of procedure risks and benefits, patient questions answered, and patient education provided: yes    Pregnancy risk: reasonably certain the patient is not pregnant    Date/Time of Insertion:  12/20/2024 3:15 PM  Immediately prior to procedure a time out was called: yes    Pelvic exam performed: yes    Speculum placed in vagina: yes    Cervix cleaned and prepped: yes    Tenaculum/Allis/Ring Forceps applied to cervix: yes    Anesthesia used: yes    Local anesthetic:  Lidocaine  Volume (mL):  5  Cervix manually dilated: no    IUD inserted without complications: yes    OSM: levonorgestreL 20.1 mcg/24 hr  Strings trimmed to (cm):  6  Patient tolerated procedure well: yes    Inserted with ultrasound guidance: no    Transvaginal sono confirmed fundal placement: no    Estimated blood loss (mL):  0  Intended  removal date: 8 years

## 2025-01-03 ENCOUNTER — OFFICE VISIT (OUTPATIENT)
Dept: OBSTETRICS AND GYNECOLOGY | Facility: CLINIC | Age: 40
End: 2025-01-03
Payer: COMMERCIAL

## 2025-01-03 VITALS
SYSTOLIC BLOOD PRESSURE: 146 MMHG | HEART RATE: 76 BPM | HEIGHT: 66 IN | DIASTOLIC BLOOD PRESSURE: 92 MMHG | BODY MASS INDEX: 23.63 KG/M2 | WEIGHT: 147 LBS

## 2025-01-03 DIAGNOSIS — N92.1 BREAKTHROUGH BLEEDING ASSOCIATED WITH INTRAUTERINE DEVICE (IUD): ICD-10-CM

## 2025-01-03 DIAGNOSIS — Z30.431 CHECKING OF INTRAUTERINE DEVICE: Primary | ICD-10-CM

## 2025-01-03 DIAGNOSIS — Z97.5 BREAKTHROUGH BLEEDING ASSOCIATED WITH INTRAUTERINE DEVICE (IUD): ICD-10-CM

## 2025-01-03 PROCEDURE — 1036F TOBACCO NON-USER: CPT

## 2025-01-03 PROCEDURE — 99212 OFFICE O/P EST SF 10 MIN: CPT

## 2025-01-03 PROCEDURE — 3077F SYST BP >= 140 MM HG: CPT

## 2025-01-03 PROCEDURE — 99213 OFFICE O/P EST LOW 20 MIN: CPT

## 2025-01-03 PROCEDURE — 3008F BODY MASS INDEX DOCD: CPT

## 2025-01-03 PROCEDURE — 3080F DIAST BP >= 90 MM HG: CPT

## 2025-01-03 RX ORDER — NORETHINDRONE 0.35 MG/1
1 TABLET ORAL DAILY
Qty: 28 TABLET | Refills: 11 | Status: SHIPPED | OUTPATIENT
Start: 2025-01-03 | End: 2026-01-03

## 2025-01-03 ASSESSMENT — PAIN SCALES - GENERAL: PAINLEVEL_OUTOF10: 0-NO PAIN

## 2025-01-03 ASSESSMENT — ENCOUNTER SYMPTOMS
CONSTITUTIONAL NEGATIVE: 0
EYES NEGATIVE: 0
CARDIOVASCULAR NEGATIVE: 0
MUSCULOSKELETAL NEGATIVE: 0
GASTROINTESTINAL NEGATIVE: 0
HEMATOLOGIC/LYMPHATIC NEGATIVE: 0
ENDOCRINE NEGATIVE: 0
RESPIRATORY NEGATIVE: 0
PSYCHIATRIC NEGATIVE: 0
NEUROLOGICAL NEGATIVE: 0
ALLERGIC/IMMUNOLOGIC NEGATIVE: 0

## 2025-01-03 ASSESSMENT — COLUMBIA-SUICIDE SEVERITY RATING SCALE - C-SSRS
6. HAVE YOU EVER DONE ANYTHING, STARTED TO DO ANYTHING, OR PREPARED TO DO ANYTHING TO END YOUR LIFE?: NO
1. IN THE PAST MONTH, HAVE YOU WISHED YOU WERE DEAD OR WISHED YOU COULD GO TO SLEEP AND NOT WAKE UP?: NO
2. HAVE YOU ACTUALLY HAD ANY THOUGHTS OF KILLING YOURSELF?: NO

## 2025-01-03 ASSESSMENT — PATIENT HEALTH QUESTIONNAIRE - PHQ9
SUM OF ALL RESPONSES TO PHQ9 QUESTIONS 1 AND 2: 0
2. FEELING DOWN, DEPRESSED OR HOPELESS: NOT AT ALL
1. LITTLE INTEREST OR PLEASURE IN DOING THINGS: NOT AT ALL

## 2025-01-03 NOTE — PROGRESS NOTES
IUD Check    Type of IUD:   Liletta   Date of insertion:   12/20/24  Other relevant history/information: patient reports light but continuous breakthrough bleeding on IUD.     Procedure Details  IUD strings visible:  yes    Other follow-up needed:   patient to follow up in one month. Prescribing POP in conjunction to assist with bleeding. Will do annual and recheck at that time.     Pt endorses no questions or concerns.   The patient was advised to call with any concerns or questions.    BARB Cota-VELMA

## 2025-02-07 ENCOUNTER — APPOINTMENT (OUTPATIENT)
Dept: OBSTETRICS AND GYNECOLOGY | Facility: CLINIC | Age: 40
End: 2025-02-07
Payer: COMMERCIAL

## 2025-02-21 ENCOUNTER — APPOINTMENT (OUTPATIENT)
Dept: OBSTETRICS AND GYNECOLOGY | Facility: CLINIC | Age: 40
End: 2025-02-21
Payer: COMMERCIAL

## 2025-03-07 DIAGNOSIS — I10 HYPERTENSION, UNSPECIFIED TYPE: ICD-10-CM

## 2025-03-11 RX ORDER — HYDROCHLOROTHIAZIDE 25 MG/1
25 TABLET ORAL DAILY
Qty: 90 TABLET | Refills: 2 | Status: SHIPPED | OUTPATIENT
Start: 2025-03-11

## 2025-04-11 ENCOUNTER — APPOINTMENT (OUTPATIENT)
Dept: OBSTETRICS AND GYNECOLOGY | Facility: CLINIC | Age: 40
End: 2025-04-11
Payer: COMMERCIAL

## 2025-04-21 ENCOUNTER — APPOINTMENT (OUTPATIENT)
Dept: PRIMARY CARE | Facility: CLINIC | Age: 40
End: 2025-04-21
Payer: COMMERCIAL

## 2025-05-10 DIAGNOSIS — I10 HYPERTENSION, UNSPECIFIED TYPE: ICD-10-CM

## 2025-05-11 RX ORDER — AMLODIPINE BESYLATE 5 MG/1
5 TABLET ORAL DAILY
Qty: 90 TABLET | Refills: 0 | Status: SHIPPED | OUTPATIENT
Start: 2025-05-11

## 2025-05-16 ENCOUNTER — APPOINTMENT (OUTPATIENT)
Dept: OBSTETRICS AND GYNECOLOGY | Facility: CLINIC | Age: 40
End: 2025-05-16

## 2025-05-27 ENCOUNTER — APPOINTMENT (OUTPATIENT)
Dept: PRIMARY CARE | Facility: CLINIC | Age: 40
End: 2025-05-27
Payer: COMMERCIAL

## 2025-05-27 VITALS — SYSTOLIC BLOOD PRESSURE: 129 MMHG | DIASTOLIC BLOOD PRESSURE: 89 MMHG

## 2025-05-27 DIAGNOSIS — Z00.00 HEALTH CARE MAINTENANCE: ICD-10-CM

## 2025-05-27 DIAGNOSIS — R19.7 DIARRHEA, UNSPECIFIED TYPE: Primary | ICD-10-CM

## 2025-05-27 DIAGNOSIS — I10 HYPERTENSION, UNSPECIFIED TYPE: ICD-10-CM

## 2025-05-27 DIAGNOSIS — F33.2 SEVERE EPISODE OF RECURRENT MAJOR DEPRESSIVE DISORDER, WITHOUT PSYCHOTIC FEATURES (MULTI): ICD-10-CM

## 2025-05-27 PROCEDURE — 3074F SYST BP LT 130 MM HG: CPT | Performed by: INTERNAL MEDICINE

## 2025-05-27 PROCEDURE — 1036F TOBACCO NON-USER: CPT | Performed by: INTERNAL MEDICINE

## 2025-05-27 PROCEDURE — 99213 OFFICE O/P EST LOW 20 MIN: CPT | Performed by: INTERNAL MEDICINE

## 2025-05-27 PROCEDURE — 3079F DIAST BP 80-89 MM HG: CPT | Performed by: INTERNAL MEDICINE

## 2025-05-27 RX ORDER — CHOLESTYRAMINE 4 G/9G
1 POWDER, FOR SUSPENSION ORAL
Qty: 90 PACKET | Refills: 11 | Status: SHIPPED | OUTPATIENT
Start: 2025-05-27 | End: 2026-05-27

## 2025-05-27 NOTE — PROGRESS NOTES
Answers submitted by the patient for this visit:  High Blood Pressure Questionnaire (Submitted on 10/1/2024)  Chief Complaint: Hypertension  Chronicity: chronic  Onset: more than 1 month ago  Progression since onset: gradually improving  anxiety: No  blurred vision: No  chest pain: No  headaches: No  malaise/fatigue: No  neck pain: No  orthopnea: No  palpitations: No  peripheral edema: No  PND: No  shortness of breath: No  sweats: No  Subjective   Patient ID: Jessica Franco is a 39 y.o. female who presents for No chief complaint on file..    HPI     Patient is a 38-year-old female with past medical history of anxiety depression ADD who presents for follow-up to hypertension.    She does have ongoing anxiety and depression as well as ADHD currently on Ritalin and Celexa.  She is currently off Wellbutrin.  She is taking the amlodipine and HCTZ as prescribed.  Her blood pressure was reported to be elevated at the psychiatrist office and therefore her Concerta was held.  She was advised to follow-up with PCP to evaluate blood pressure and to ensure it is safe to restart medications.      She works as a nurse.  Denies illicit substances or smoking.  Drinks alcohol occasionally.    She does have episodes of diarrhea which has been thought to be due to be post cholecystectomy diarrhea.  She has been on cholestyramine in the past but is have been having difficulty getting the light version.  Needs a refill of the current dosing.        Review of Systems  Constitutional: No fever or chills  Cardiovascular: no chest pain, no palpitations and no syncope.   Respiratory: no cough, no shortness of breath during exertion and no shortness of breath at rest.   Gastrointestinal: no abdominal pain, no nausea and no vomiting.  Positive diarrhea  Neuro: No Headache, no dizziness    Objective   /89     Physical Exam  Constitutional: Alert and in no acute distress. Well developed, well nourished  Head and Face: Head and face: Normal.     Cardiovascular: Heart rate and rhythm were normal, normal S1 and S2. No peripheral edema.   Pulmonary: No respiratory distress. Clear bilateral breath sounds.  Musculoskeletal: Gait and station: Normal. Muscle strength/tone: Normal.   Skin: Normal skin color and pigmentation, normal skin turgor, and no rash.    Psychiatric: Judgment and insight: Intact. Mood and affect: Normal.  Abdomen soft right upper quadrant tenderness to palpation but no guarding.     Procedures    Lab Results   Component Value Date    WBC 10.0 04/29/2024    HGB 14.0 04/29/2024    HCT 42.7 04/29/2024     04/29/2024    ALT 13 04/29/2024    AST 21 04/29/2024     04/29/2024    K 4.1 04/29/2024     04/29/2024    CREATININE 0.88 04/29/2024    BUN 8 04/29/2024    CO2 26 04/29/2024    TSH 1.65 04/29/2024       US PELVIS TRANSABDOMINAL WITH TRANSVAGINAL  Narrative: Interpreted By:  Froylan Mcnamara and Afshari Mirak Sohrab   STUDY:  US PELVIS TRANSABDOMINAL WITH TRANSVAGINAL;  5/21/2024 1:27 pm      INDICATION:  Signs/Symptoms:Abnormal bleeding, hx ovarian cysts.      COMPARISON:  None.      ACCESSION NUMBER(S):  [id number]      ORDERING CLINICIAN:  SRAVAN SHEARER      TECHNIQUE:  Multiple multiplanar static gray scale, color and spectral waveform  sonographic images of the pelvis were obtained. Transabdominal and  endovaginal ultrasound was performed.      FINDINGS:  UTERUS:  The uterus measures  4.35 cm x 3.57 cm x 7.35cm. The uterine  myometrium appears normal. There is a heterogeneous appearance of the  cervix without sonographic evidence of focal lesions.      ENDOMETRIUM:  The endometrium measures a thickness of 0.36 cm, which is normal.      RIGHT ADNEXA:  The right ovary measures 1.60 cm x 1.69 cm x 2.83 cm and demonstrates  normal flow. No gross right adnexal masses are seen, no hydrosalpinx.  There are cysts in the right ovary measuring 1.3 x 1.3 x 1.2 cm and 1  x 0.7 x 0.8 cm.      LEFT ADNEXA:  The left ovary  measures 2.46 cm x 2.48 cm x 3.70 cm and demonstrates  normal flow. No gross left adnexal masses are seen, no hydrosalpinx.  There is a cyst in the left ovary measuring 1 x 1.2 x 1.1 cm.      CUL DE SAC:  No gross free fluid is seen in the pelvic cul-de-sac.      Impression: 1. Indeterminate heterogeneous appearance of the endocervix without  sonographic evidence of focal lesions. Hysteroscopy can be obtained  for further assessment as per clinical discretion.  2. Otherwise, unremarkable pelvic ultrasound.      I personally reviewed the images/study and I agree with the findings  as stated by resident physician Dr. Gerald Hsu . This study  was interpreted at [place], [place].      MACRO:  None      Signed by: Froylan Mcnamara 5/21/2024 9:12 PM  Dictation workstation:   [id number]            Assessment/Plan   Problem List Items Addressed This Visit           ICD-10-CM    Hypertension I10     In the office the blood pressure is well-controlled.  Would continue current medications as is.  She is cleared to restart the Concerta however if while on the Concerta her blood pressure is noted to be elevated once again we will need to double the dose of the amlodipine so that she is taking 10 mg once daily and recheck.  Alternatively if her blood pressure is difficult to control while on the medication the psychiatrist might want to consider an alternate medication

## 2025-05-28 LAB
ALBUMIN SERPL-MCNC: 4 G/DL (ref 3.6–5.1)
ALP SERPL-CCNC: 96 U/L (ref 31–125)
ALT SERPL-CCNC: 29 U/L (ref 6–29)
ANION GAP SERPL CALCULATED.4IONS-SCNC: 9 MMOL/L (CALC) (ref 7–17)
AST SERPL-CCNC: 57 U/L (ref 10–30)
BILIRUB SERPL-MCNC: 0.5 MG/DL (ref 0.2–1.2)
BUN SERPL-MCNC: 7 MG/DL (ref 7–25)
CALCIUM SERPL-MCNC: 9.2 MG/DL (ref 8.6–10.2)
CHLORIDE SERPL-SCNC: 103 MMOL/L (ref 98–110)
CHOLEST SERPL-MCNC: 143 MG/DL
CHOLEST/HDLC SERPL: 3.2 (CALC)
CO2 SERPL-SCNC: 27 MMOL/L (ref 20–32)
CREAT SERPL-MCNC: 0.67 MG/DL (ref 0.5–0.97)
EGFRCR SERPLBLD CKD-EPI 2021: 115 ML/MIN/1.73M2
ERYTHROCYTE [DISTWIDTH] IN BLOOD BY AUTOMATED COUNT: 13.8 % (ref 11–15)
GLUCOSE SERPL-MCNC: 78 MG/DL (ref 65–99)
HCT VFR BLD AUTO: 49 % (ref 35–45)
HDLC SERPL-MCNC: 45 MG/DL
HGB BLD-MCNC: 16 G/DL (ref 11.7–15.5)
LDLC SERPL CALC-MCNC: 74 MG/DL (CALC)
MCH RBC QN AUTO: 33.3 PG (ref 27–33)
MCHC RBC AUTO-ENTMCNC: 32.7 G/DL (ref 32–36)
MCV RBC AUTO: 102.1 FL (ref 80–100)
NONHDLC SERPL-MCNC: 98 MG/DL (CALC)
PLATELET # BLD AUTO: 309 THOUSAND/UL (ref 140–400)
PMV BLD REES-ECKER: 10.1 FL (ref 7.5–12.5)
POTASSIUM SERPL-SCNC: 3.5 MMOL/L (ref 3.5–5.3)
PROT SERPL-MCNC: 6.4 G/DL (ref 6.1–8.1)
RBC # BLD AUTO: 4.8 MILLION/UL (ref 3.8–5.1)
SODIUM SERPL-SCNC: 139 MMOL/L (ref 135–146)
TRIGL SERPL-MCNC: 153 MG/DL
TSH SERPL-ACNC: 1.4 MIU/L
WBC # BLD AUTO: 5.8 THOUSAND/UL (ref 3.8–10.8)

## 2025-06-06 DIAGNOSIS — R10.13 EPIGASTRIC PAIN: Primary | ICD-10-CM

## 2025-06-06 DIAGNOSIS — R19.7 DIARRHEA, UNSPECIFIED TYPE: ICD-10-CM

## 2025-06-13 ENCOUNTER — OFFICE VISIT (OUTPATIENT)
Dept: OBSTETRICS AND GYNECOLOGY | Facility: CLINIC | Age: 40
End: 2025-06-13
Payer: COMMERCIAL

## 2025-06-13 VITALS
HEART RATE: 78 BPM | BODY MASS INDEX: 23.79 KG/M2 | SYSTOLIC BLOOD PRESSURE: 152 MMHG | DIASTOLIC BLOOD PRESSURE: 96 MMHG | WEIGHT: 147.4 LBS

## 2025-06-13 DIAGNOSIS — Z12.4 CERVICAL CANCER SCREENING: ICD-10-CM

## 2025-06-13 DIAGNOSIS — Z01.419 WELL WOMAN EXAM WITH ROUTINE GYNECOLOGICAL EXAM: Primary | ICD-10-CM

## 2025-06-13 PROCEDURE — 3080F DIAST BP >= 90 MM HG: CPT

## 2025-06-13 PROCEDURE — 3077F SYST BP >= 140 MM HG: CPT

## 2025-06-13 PROCEDURE — 1036F TOBACCO NON-USER: CPT

## 2025-06-13 PROCEDURE — 87626 HPV SEP HI-RSK TYP&POOL RSLT: CPT

## 2025-06-13 PROCEDURE — 99395 PREV VISIT EST AGE 18-39: CPT

## 2025-06-13 ASSESSMENT — PAIN SCALES - GENERAL: PAINLEVEL_OUTOF10: 0-NO PAIN

## 2025-06-13 NOTE — PROGRESS NOTES
Subjective   Jessica Franco is a 39 y.o. female who is here for Annual Exam (Pt declines STI screening./Pt declines breast exam. /Pt would like to discuss recent IUD placement. /Last pap 24 /Colpo 24).     Periods are regular every 28-30 days, lasting 3 days.   Patients reports very light     PMH: hypertension     Sexual Activity: sexually active, male partners; Patient reports 1 partners in the last 12 months.    History of prior STI: none  Desires STI screening? No    Current contraception: IUD- liletta placed 2024    Last pap: - ACUS w/HPV, colpo negative. Reccommended repeat today.   Last mammogram: NA    Family history of uterine or ovarian cancer: no  Family history of breast cancer: no    Mother passed away from cervical cancer.   OB History    Para Term  AB Living   0 0 0 0 0 0   SAB IAB Ectopic Multiple Live Births   0 0 0 0 0      Objective   BP (!) 152/96   Pulse 78   Wt 66.9 kg (147 lb 6.4 oz)   LMP 05/15/2025 (Exact Date)      General:   Alert and oriented x 3   Heart:  Lungs: Regular rate, rhythm  Clear to auscultation bilaterally   Thyroid: Euthyroid, normal shape and size   Breast: Declined    Abdomen: Soft, non tender   Vulva: EGBUS normal   Vagina: Pink, normal discharge   Cervix: No CMT   Uterus: Normal shape, size   Adnexa: NT bilaterally     Assessment/Plan   Diagnoses and all orders for this visit:  Well woman exam with routine gynecological exam  Cervical cancer screening  -     THINPREP PAP TEST    All patient questions answered.   Encouraged to reach out to our office with any questions or concerns.   Encouraged patient to follow up annually and PRN    BLAYNE Cota

## 2025-06-23 ENCOUNTER — APPOINTMENT (OUTPATIENT)
Dept: GASTROENTEROLOGY | Facility: CLINIC | Age: 40
End: 2025-06-23
Payer: COMMERCIAL

## 2025-06-23 VITALS
DIASTOLIC BLOOD PRESSURE: 99 MMHG | TEMPERATURE: 97.3 F | WEIGHT: 148 LBS | HEART RATE: 85 BPM | HEIGHT: 66 IN | SYSTOLIC BLOOD PRESSURE: 137 MMHG | BODY MASS INDEX: 23.78 KG/M2

## 2025-06-23 DIAGNOSIS — K52.9 CHRONIC DIARRHEA: Primary | ICD-10-CM

## 2025-06-23 DIAGNOSIS — R10.13 EPIGASTRIC PAIN: ICD-10-CM

## 2025-06-23 DIAGNOSIS — R19.7 DIARRHEA, UNSPECIFIED TYPE: ICD-10-CM

## 2025-06-23 DIAGNOSIS — R10.9 ABDOMINAL CRAMPING: ICD-10-CM

## 2025-06-23 DIAGNOSIS — R14.0 ABDOMINAL BLOATING: ICD-10-CM

## 2025-06-23 DIAGNOSIS — K62.5 BRBPR (BRIGHT RED BLOOD PER RECTUM): ICD-10-CM

## 2025-06-23 DIAGNOSIS — R14.0 ABDOMINAL DISTENSION: ICD-10-CM

## 2025-06-23 PROCEDURE — 99212 OFFICE O/P EST SF 10 MIN: CPT | Performed by: NURSE PRACTITIONER

## 2025-06-23 PROCEDURE — 3008F BODY MASS INDEX DOCD: CPT | Performed by: NURSE PRACTITIONER

## 2025-06-23 PROCEDURE — 3075F SYST BP GE 130 - 139MM HG: CPT | Performed by: NURSE PRACTITIONER

## 2025-06-23 PROCEDURE — 3080F DIAST BP >= 90 MM HG: CPT | Performed by: NURSE PRACTITIONER

## 2025-06-23 PROCEDURE — 99204 OFFICE O/P NEW MOD 45 MIN: CPT | Performed by: NURSE PRACTITIONER

## 2025-06-23 RX ORDER — NORETHINDRONE 0.35 MG/1
1 TABLET ORAL DAILY
COMMUNITY

## 2025-06-23 RX ORDER — CHOLESTYRAMINE 4 G/9G
1 POWDER, FOR SUSPENSION ORAL
Qty: 90 PACKET | Refills: 11 | Status: SHIPPED | OUTPATIENT
Start: 2025-06-23 | End: 2026-06-23

## 2025-06-23 ASSESSMENT — ENCOUNTER SYMPTOMS
COUGH: 0
ARTHRALGIAS: 0
FEVER: 0
AGITATION: 0
WHEEZING: 0
HEMATURIA: 0
PALPITATIONS: 0
BACK PAIN: 0
NUMBNESS: 0
FLANK PAIN: 0
PHOTOPHOBIA: 0
FATIGUE: 0
DYSURIA: 0
HEADACHES: 0
FREQUENCY: 0
JOINT SWELLING: 0
CHILLS: 0
LIGHT-HEADEDNESS: 0
NERVOUS/ANXIOUS: 0
HALLUCINATIONS: 0
EYE PAIN: 0
ADENOPATHY: 0
DIAPHORESIS: 0
MYALGIAS: 0
DIZZINESS: 0
WEAKNESS: 0
SORE THROAT: 0
SHORTNESS OF BREATH: 0

## 2025-06-23 NOTE — PATIENT INSTRUCTIONS
Thanks for coming to the GI clinic.    I would like you to get a hydrogen breath test to evaluate for small intestinal bacterial overgrowth.    Please obtain a stool test to check for inflammatory bowel disease.    I am going to send a prescription for cholestyramine to the  specialty pharmacy to hopefully assist with getting this covered for you.    Follow-up with me in clinic 2 weeks after completion of the above testing.

## 2025-06-23 NOTE — PROGRESS NOTES
"Subjective   Patient ID: Jessica Franco is a 39 y.o. female who presents for diarrhea.    This is a 39-year-old WF with history of anxiety, depression, ADHD, HTN, and cholecystectomy (2016) who is presenting to the GI clinic for initial visit.      History per patient and review of EMR    Patient is here per PCP referral.    Reports diarrhea which started following cholecystectomy in 2016. She has diarrhea 3-5 times a day. Describes stools as between Taney type 6-7. Bentyl helped, but it made her feel \"woozy.\"  She also endorses relief with cholestyramine 3 times daily, but she is no longer taking it as she reports the cost is too high ($65).    She avoids fatty foods and high carb foods.    ROS positive for nocturnal diarrhea 1-2 times a week.    ROS positive for abdominal cramping related to the diarrhea on occasion.    ROS positive for abdominal bloating and abdominal distension.     Reports seeing undigested food in her stools including lettuce and hash browns.  States that if she drinks red Gatorade her stool looks red.     She's never been on a gluten diet.     She states that dairy products do not really cause her any GI distress.    ROS positive for BRBPR for the past few months.  She reports seeing blood on the toilet paper when wiping.  She denies any history of hemorrhoids.      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  In 2019 she underwent EGD and colonoscopy through [facility].  EGD noted LA grade A esophagitis. " "Colonoscopy was normal.  Random biopsies of the duodenum and colon were normal.       Review of Systems   Constitutional:  Negative for chills, diaphoresis, fatigue and fever.   HENT:  Negative for congestion, ear pain, hearing loss, sneezing and sore throat.    Eyes:  Negative for photophobia, pain and visual disturbance.   Respiratory:  Negative for cough, shortness of breath and wheezing.    Cardiovascular:  Negative for chest pain, palpitations and leg swelling.   Endocrine: Negative for cold intolerance and heat intolerance.   Genitourinary:  Negative for dysuria, flank pain, frequency and hematuria.   Musculoskeletal:  Negative for arthralgias, back pain, gait problem, joint swelling and myalgias.   Skin:  Negative for rash.   Neurological:  Negative for dizziness, syncope, weakness, light-headedness, numbness and headaches.   Hematological:  Negative for adenopathy.   Psychiatric/Behavioral:  Negative for agitation and hallucinations. The patient is not nervous/anxious.      Objective     Lab Results   Component Value Date    WBC 5.8 05/27/2025    HGB 16.0 (H) 05/27/2025    HCT 49.0 (H) 05/27/2025    .1 (H) 05/27/2025     05/27/2025      Lab Results   Component Value Date    CREATININE 0.67 05/27/2025    BUN 7 05/27/2025     05/27/2025    K 3.5 05/27/2025     05/27/2025    CO2 27 05/27/2025      Lab Results   Component Value Date    ALT 29 05/27/2025    AST 57 (H) 05/27/2025    ALKPHOS 96 05/27/2025    BILITOT 0.5 05/27/2025      Lab Results   Component Value Date    TSH 1.40 05/27/2025        BP (!) 137/99 Comment: 145/105 RIGHT ARM  Pulse 85   Temp 36.3 °C (97.3 °F) (Temporal)   Ht 1.676 m (5' 6\")   Wt 67.1 kg (148 lb)   LMP 05/15/2025 (Exact Date)   BMI 23.89 kg/m²     Physical Exam  Constitutional:       General: She is not in acute distress.     Appearance: Normal appearance.   HENT:      Head: Normocephalic and atraumatic.   Eyes:      Conjunctiva/sclera: Conjunctivae " normal.   Cardiovascular:      Rate and Rhythm: Normal rate and regular rhythm.      Heart sounds: No murmur heard.     No gallop.   Pulmonary:      Effort: Pulmonary effort is normal.      Breath sounds: Normal breath sounds.   Abdominal:      General: Bowel sounds are normal. There is no distension.      Tenderness: There is no abdominal tenderness. There is no guarding.   Musculoskeletal:         General: No swelling or deformity. Normal range of motion.      Cervical back: Normal range of motion. No rigidity.   Skin:     General: Skin is warm and dry.      Coloration: Skin is not jaundiced.      Findings: No lesion or rash.   Neurological:      General: No focal deficit present.      Mental Status: She is alert and oriented to person, place, and time.   Psychiatric:         Mood and Affect: Mood normal.         Assessment/Plan   Problem List Items Addressed This Visit       Diarrhea    Epigastric pain     Other Visit Diagnoses         Chronic diarrhea    -  Primary    Relevant Medications    cholestyramine (Questran) 4 gram packet    Other Relevant Orders    Breath Hydrogen Test-Bacterial Overgrowth    Calprotectin, Fecal      Abdominal cramping          Abdominal bloating          Abdominal distension          BRBPR (bright red blood per rectum)               1.  Chronic diarrhea: Overall suspect this is at least in part related to prior cholecystectomy.  In the setting of abdominal cramping, abdominal bloating, and abdominal distention would also consider IBS and SIBO.  Lastly with nocturnal diarrhea consider occult IBD, but this seems less likely.  - Obtain hydrogen breath test  - Check fecal calprotectin  - Start cholestyramine 4 g 3 times daily (will send prescription to  Specialty Pharmacy to hopefully assist with coverage)    2.  BRBPR: Etiology not entirely clear, but this overall sounds outlet in nature.  I discussed a rectal examination today, but she declined.  - Will monitor   - Could consider  repeat colonoscopy at some point    3.  Follow-up:  - Return to clinic 2 weeks after completion of the above testing         [1] No Known Allergies  [2]   Current Outpatient Medications   Medication Sig Dispense Refill    amLODIPine (Norvasc) 5 mg tablet Take 1 tablet (5 mg) by mouth once daily. 90 tablet 0    busPIRone (Buspar) 15 mg tablet Take 1 tablet (15 mg) by mouth 3 times a day.      citalopram (CeleXA) 10 mg tablet Take 1 tablet (10 mg) by mouth once daily.      Concerta 18 mg OSM extended release tablet Take 1 tablet (18 mg) by mouth once daily.      gabapentin (Neurontin) 300 mg capsule Take 1 capsule 3 times a day by oral route.      hydroCHLOROthiazide (HYDRODiuril) 25 mg tablet TAKE 1 TABLET BY MOUTH EVERY DAY 90 tablet 2    norethindrone (Incassia) 0.35 mg tablet Take 1 tablet (0.35 mg) by mouth once daily.      cholestyramine (Questran) 4 gram packet Take 1 packet (4 g) by mouth 3 times daily (morning, midday, late afternoon). 90 packet 11    cholestyramine light (Prevalite) 4 gram packet Take 1 packet (4 g) by mouth 3 times a day. (Patient not taking: Reported on 6/23/2025) 270 packet 1     No current facility-administered medications for this visit.

## 2025-06-24 ENCOUNTER — SPECIALTY PHARMACY (OUTPATIENT)
Dept: PHARMACY | Facility: CLINIC | Age: 40
End: 2025-06-24

## 2025-07-10 LAB
CYTOLOGY CMNT CVX/VAG CYTO-IMP: NORMAL
HPV HR 12 DNA GENITAL QL NAA+PROBE: POSITIVE
HPV HR GENOTYPES PNL CVX NAA+PROBE: POSITIVE
HPV16 DNA SPEC QL NAA+PROBE: NEGATIVE
HPV18 DNA SPEC QL NAA+PROBE: NEGATIVE
LAB AP HPV GENOTYPE QUESTION: YES
LAB AP HPV HR: NORMAL
LABORATORY COMMENT REPORT: NORMAL
LMP START DATE: NORMAL
PATH REPORT.TOTAL CANCER: NORMAL

## 2025-07-11 ENCOUNTER — TELEPHONE (OUTPATIENT)
Dept: OBSTETRICS AND GYNECOLOGY | Facility: CLINIC | Age: 40
End: 2025-07-11
Payer: COMMERCIAL

## 2025-07-11 NOTE — TELEPHONE ENCOUNTER
Pt notified of results and plan----- Message from Greer Camacho sent at 7/10/2025  4:14 PM EDT -----  Regarding: +HPV with ASCUS- no colpo  This patient's pap was abnormal with +HPV and +ascus. She had a colpo last year which was negative. Per the ASCCP guidelines she should follow up in one year for a follow up pap. Can someone relay   this information to the patient? Let me know if there are any questions. Thanks!     BLAYNE Cota  ----- Message -----  From: Lab, Background User  Sent: 7/10/2025  11:20 AM EDT  To: BLAYNE Cota

## 2025-08-05 ENCOUNTER — SPECIALTY PHARMACY (OUTPATIENT)
Dept: PHARMACY | Facility: CLINIC | Age: 40
End: 2025-08-05

## 2025-08-16 DIAGNOSIS — I10 HYPERTENSION, UNSPECIFIED TYPE: ICD-10-CM

## 2025-08-18 ENCOUNTER — APPOINTMENT (OUTPATIENT)
Dept: GASTROENTEROLOGY | Facility: CLINIC | Age: 40
End: 2025-08-18
Payer: COMMERCIAL

## 2025-08-18 RX ORDER — AMLODIPINE BESYLATE 5 MG/1
5 TABLET ORAL DAILY
Qty: 90 TABLET | Refills: 0 | Status: SHIPPED | OUTPATIENT
Start: 2025-08-18

## 2025-08-27 ENCOUNTER — APPOINTMENT (OUTPATIENT)
Dept: PRIMARY CARE | Facility: CLINIC | Age: 40
End: 2025-08-27
Payer: COMMERCIAL

## 2025-08-27 VITALS
HEART RATE: 90 BPM | WEIGHT: 151 LBS | DIASTOLIC BLOOD PRESSURE: 88 MMHG | BODY MASS INDEX: 24.37 KG/M2 | SYSTOLIC BLOOD PRESSURE: 138 MMHG

## 2025-08-27 DIAGNOSIS — K58.0 IRRITABLE BOWEL SYNDROME WITH DIARRHEA: Primary | ICD-10-CM

## 2025-08-27 DIAGNOSIS — I10 HYPERTENSION, UNSPECIFIED TYPE: ICD-10-CM

## 2025-08-27 DIAGNOSIS — D51.9 ANEMIA DUE TO VITAMIN B12 DEFICIENCY, UNSPECIFIED B12 DEFICIENCY TYPE: ICD-10-CM

## 2025-08-27 DIAGNOSIS — R71.8 ELEVATED MCV: ICD-10-CM

## 2025-08-27 DIAGNOSIS — F33.2 SEVERE EPISODE OF RECURRENT MAJOR DEPRESSIVE DISORDER, WITHOUT PSYCHOTIC FEATURES (MULTI): Chronic | ICD-10-CM

## 2025-08-27 DIAGNOSIS — Z13.0 SCREENING FOR IRON DEFICIENCY ANEMIA: ICD-10-CM

## 2025-08-27 DIAGNOSIS — E55.9 VITAMIN D DEFICIENCY DISEASE: ICD-10-CM

## 2025-08-27 PROCEDURE — 3075F SYST BP GE 130 - 139MM HG: CPT | Performed by: INTERNAL MEDICINE

## 2025-08-27 PROCEDURE — 1036F TOBACCO NON-USER: CPT | Performed by: INTERNAL MEDICINE

## 2025-08-27 PROCEDURE — 3079F DIAST BP 80-89 MM HG: CPT | Performed by: INTERNAL MEDICINE

## 2025-08-27 PROCEDURE — 99214 OFFICE O/P EST MOD 30 MIN: CPT | Performed by: INTERNAL MEDICINE

## 2025-08-27 RX ORDER — CITALOPRAM 20 MG/1
20 TABLET ORAL
Qty: 90 TABLET | Refills: 1 | Status: SHIPPED | OUTPATIENT
Start: 2025-08-27

## 2025-08-27 RX ORDER — COLESEVELAM 180 1/1
625 TABLET ORAL
Qty: 60 TABLET | Refills: 1 | Status: SHIPPED | OUTPATIENT
Start: 2025-08-27 | End: 2025-10-26

## 2025-08-28 LAB
25(OH)D3+25(OH)D2 SERPL-MCNC: 14 NG/ML (ref 30–100)
ERYTHROCYTE [DISTWIDTH] IN BLOOD BY AUTOMATED COUNT: 12.6 % (ref 11–15)
FOLATE SERPL-MCNC: 3.3 NG/ML
HCT VFR BLD AUTO: 48.8 % (ref 35–45)
HGB BLD-MCNC: 16.3 G/DL (ref 11.7–15.5)
IRON SATN MFR SERPL: 8 % (CALC) (ref 16–45)
IRON SERPL-MCNC: 42 MCG/DL (ref 40–190)
MCH RBC QN AUTO: 33.9 PG (ref 27–33)
MCHC RBC AUTO-ENTMCNC: 33.4 G/DL (ref 32–36)
MCV RBC AUTO: 101.5 FL (ref 80–100)
PLATELET # BLD AUTO: 329 THOUSAND/UL (ref 140–400)
PMV BLD REES-ECKER: 10.4 FL (ref 7.5–12.5)
RBC # BLD AUTO: 4.81 MILLION/UL (ref 3.8–5.1)
TIBC SERPL-MCNC: 524 MCG/DL (CALC) (ref 250–450)
VIT B12 SERPL-MCNC: 1134 PG/ML (ref 200–1100)
WBC # BLD AUTO: 8.3 THOUSAND/UL (ref 3.8–10.8)

## 2025-11-28 ENCOUNTER — APPOINTMENT (OUTPATIENT)
Dept: PRIMARY CARE | Facility: CLINIC | Age: 40
End: 2025-11-28
Payer: COMMERCIAL